# Patient Record
Sex: FEMALE | Race: AMERICAN INDIAN OR ALASKA NATIVE
[De-identification: names, ages, dates, MRNs, and addresses within clinical notes are randomized per-mention and may not be internally consistent; named-entity substitution may affect disease eponyms.]

---

## 2019-08-23 ENCOUNTER — HOSPITAL ENCOUNTER (OUTPATIENT)
Dept: HOSPITAL 5 - OR | Age: 39
Discharge: HOME | End: 2019-08-23
Attending: OBSTETRICS & GYNECOLOGY
Payer: COMMERCIAL

## 2019-08-23 ENCOUNTER — HOSPITAL ENCOUNTER (OUTPATIENT)
Dept: HOSPITAL 5 - OR | Age: 39
Discharge: HOME | End: 2019-08-23
Attending: EMERGENCY MEDICINE
Payer: COMMERCIAL

## 2019-08-23 VITALS — SYSTOLIC BLOOD PRESSURE: 117 MMHG | DIASTOLIC BLOOD PRESSURE: 63 MMHG

## 2019-08-23 VITALS — SYSTOLIC BLOOD PRESSURE: 103 MMHG | DIASTOLIC BLOOD PRESSURE: 53 MMHG

## 2019-08-23 DIAGNOSIS — Z79.899: ICD-10-CM

## 2019-08-23 DIAGNOSIS — Z87.891: ICD-10-CM

## 2019-08-23 DIAGNOSIS — O00.101: Primary | ICD-10-CM

## 2019-08-23 DIAGNOSIS — K66.1: ICD-10-CM

## 2019-08-23 DIAGNOSIS — Z53.8: ICD-10-CM

## 2019-08-23 DIAGNOSIS — Z98.890: ICD-10-CM

## 2019-08-23 LAB
HCT VFR BLD CALC: 39 % (ref 30.3–42.9)
HGB BLD-MCNC: 13.7 GM/DL (ref 10.1–14.3)
MCHC RBC AUTO-ENTMCNC: 35 % (ref 30–34)
MCV RBC AUTO: 96 FL (ref 79–97)
PLATELET # BLD: 276 K/MM3 (ref 140–440)
RBC # BLD AUTO: 4.07 M/MM3 (ref 3.65–5.03)

## 2019-08-23 PROCEDURE — C9250 ARTISS FIBRIN SEALANT: HCPCS

## 2019-08-23 PROCEDURE — 86900 BLOOD TYPING SEROLOGIC ABO: CPT

## 2019-08-23 PROCEDURE — A4217 STERILE WATER/SALINE, 500 ML: HCPCS

## 2019-08-23 PROCEDURE — 36415 COLL VENOUS BLD VENIPUNCTURE: CPT

## 2019-08-23 PROCEDURE — 99283 EMERGENCY DEPT VISIT LOW MDM: CPT

## 2019-08-23 PROCEDURE — 84702 CHORIONIC GONADOTROPIN TEST: CPT

## 2019-08-23 PROCEDURE — 59151 TREAT ECTOPIC PREGNANCY: CPT

## 2019-08-23 PROCEDURE — 85027 COMPLETE CBC AUTOMATED: CPT

## 2019-08-23 PROCEDURE — 86850 RBC ANTIBODY SCREEN: CPT

## 2019-08-23 PROCEDURE — 88305 TISSUE EXAM BY PATHOLOGIST: CPT

## 2019-08-23 PROCEDURE — 84703 CHORIONIC GONADOTROPIN ASSAY: CPT

## 2019-08-23 PROCEDURE — 86901 BLOOD TYPING SEROLOGIC RH(D): CPT

## 2019-08-23 NOTE — EMERGENCY DEPARTMENT REPORT
HPI





- General


Time Seen by Provider: 19 10:57





- HPI


HPI: 


39-year-old  female presents to the emergency department, sent 

in by the office of her OB/GYN Dr. Popeye Murrieta, with the report of a right-

sided ectopic pregnancy.  With this pregnancy, the patient is  with 2 

previous miscarriages.  The patient says that she was having some vaginal 

spotting over the past few days and went into the OB/GYN office for concern for 

a threatened miscarriage.  She had her hormone level checked 2 days ago and was 

told to return today.  In between that time, the patient says that she's been 

having some diarrhea and a little bit of right-sided abdominal discomfort.  She 

took a tramadol for her discomfort that she had from a previous miscarriage, as 

the patient says that this felt the same.  While she was at the OB/GYN office 

today, they noticed that she did not look well and decided to do an ultrasound 

that shows a right-sided ectopic pregnancy.  The patient is due to be taken to 

the operating room shortly by Dr. Murrieta.








ED Past Medical Hx





- Past Medical History


Previous Medical History?: No





- Surgical History


Past Surgical History?: Yes


Additional Surgical History: right foot





- Social History


Smoking Status: Former Smoker


Substance Use Type: Alcohol, Marijuana





ED Review of Systems


ROS: 


Stated complaint: POSITIVE EPTOPIC


Other details as noted in HPI





Comment: All other systems reviewed and negative


Constitutional: denies: chills, fever


Eyes: denies: eye pain, vision change


ENT: denies: ear pain, throat pain


Respiratory: denies: cough, shortness of breath


Cardiovascular: denies: chest pain


Gastrointestinal: abdominal pain, diarrhea


Genitourinary: other (vaginal spotting).  denies: dysuria, discharge


Musculoskeletal: denies: back pain, arthralgia


Skin: denies: rash, lesions


Neurological: denies: headache, weakness





Physical Exam





- Physical Exam


Vital Signs: 


                                   Vital Signs











  19





  10:54


 


Temperature 97.9 F


 


Pulse Rate 82


 


Respiratory 18





Rate 


 


Blood Pressure 117/63


 


O2 Sat by Pulse 98





Oximetry 











Physical Exam: 





GENERAL: The patient is well-developed well-nourished.


HENT: Normocephalic.  Atraumatic.    Patient has moist mucous membranes.


EYES: Extraocular motions are intact.  


NECK: Supple. Trachea is midline.


CHEST/LUNGS: Clear to auscultation.  There is no respiratory distress noted.


HEART/CARDIOVASCULAR: Regular.  There is no tachycardia.  There is no murmur.


ABDOMEN: Abdomen is soft.  Mild right lower quadrant abdominal and pelvic pain 

to palpation.  No guarding.  Patient has normal bowel sounds.  There is no 

abdominal distention.


SKIN: Skin is warm and dry.


NEURO: The patient is awake, alert, and oriented.  The patient is cooperative.  

The patient has no focal neurologic deficits.  Normal speech.


MUSCULOSKELETAL: There is no tenderness or deformity.   There is no evidence of 

acute injury.





ED Course


                                   Vital Signs











  19





  10:54


 


Temperature 97.9 F


 


Pulse Rate 82


 


Respiratory 18





Rate 


 


Blood Pressure 117/63


 


O2 Sat by Pulse 98





Oximetry 














ED Medical Decision Making





- Lab Data


Result diagrams: 


                                 19 11:21








- Medical Decision Making





This patient was sent in by the OB/GYN with a confirmed right ectopic adnexal 

pregnancy.  Patient appears hemodynamically stable.  Vital signs stable.  Mild 

leukocytosis.  Patient is being brought to the operating room for treatment by 

Dr. Popeye Murrieta.





- Differential Diagnosis


ectopic pregnancy, miscarriage, pregnancy


Critical Care Time: No


Critical care attestation.: 


If time is entered above; I have spent that time in minutes in the direct care 

of this critically ill patient, excluding procedure time.








ED Disposition


Clinical Impression: 


 Ectopic pregnancy





Disposition:  OP ADMIT IP TO THIS HOSP


Is pt being admited?: Yes


Condition: Serious


Time of Disposition: 12:06

## 2019-08-23 NOTE — OPERATIVE REPORT
Operative Report


Operative Report: 





Date of procedure: 08/23/2019





Pre-operative diagnosis: Right ectopic pregnancy





Post-operative diagnosis: 1.  Ruptured right ectopic pregnancy   2.  

Hematoperitoneum





Procedure name(s): Laparoscopic right salpingectomy





Surgeon: Popeye Murrieta MD





Assistant: None





Anesthesia: Gen. endotracheal intubation by Dr. Lyn





EBL: 400 mL's of hematoperitoneum





Findings: A normal uterus with normal left fallopian tube and ovary, right 

fallopian tube showed an enlarged mass occupying the entire right fallopian t

ube.  Normal ovary.  Approximately 400 mL's of hematoperitoneum.





Procedure: After the patient was correctly identified, she was prepped and 

draped in the usual straw fashion and placed in the lithotomy position.  Next 

the bladder was emptied using straight catheter, and the speculum space and the 

vaginal vault.  The anterior lip of the cervix was grasped using single-tooth 

tenaculum and the uterine manipulator was then placed and the tenaculum and 

speculum were removed.  Attention was then turned to the abdomen where first a 

periumbilical incision was made using the skin knife, and the Optiview trocar 

was inserted under direct visualization.  After an adequate amount for abdominal

insufflation, visualization of the pelvic organs found large amounts of blood 

and blood clots in the peritoneal cavity.  A suprapubic incision was made 

through which a 5 mm trocar was placed, and the suction  was used to 

suction approximate 400 mL of blood and blood clots.  The uterus appeared to be 

normal with normal left fallopian tube and ovary, and the right fallopian tube 

showed an enlarged mass occupying the entire right fallopian tube.  A right 

paramedian incision was made through which another 5 mm trocar was placed in 

order to clamp, cauterize and cut cross the right fallopian tube excising the 

entire right fallopian tube.  Copious amounts of irrigation was then performed, 

and after good hemostasis was assured the procedure was considered complete.  

The Tisseel Sealant was sprayed across the salpingectomy site, and excellent 

hemostasis was assured.  All instruments are then removed from the abdomen, the 

abdomen deflated, and the periumbilical incision was closed using 0 Vicryl 

suture in a figure-of-eight configuration of the fascia followed by 4 Monocryl 

suture in a sub-cuticular fashion on the skin.  The suprapubic and right 

paramedian incisions were closed in similar fashion.  Each incision was 

infiltrated using 0.5% Marcaine solution.  The uterine manipulator was removed. 

The Flores catheter also removed.  The patient tolerated the procedure well and 

was transported to recovery room in stable condition.

## 2019-08-23 NOTE — SHORT STAY SUMMARY
Short Stay Documentation


Date of service: 19


Narrative H&P: 





Pt is a 39-year-old  female  LMP 7/3/19 presents to the 

emergency department, sent in by the office with the report of a right-sided 

ectopic pregnancy - diagnosed by pelvic u/s today.  She says that she was having

some vaginal spotting over the past few days and went into the OB/GYN office for

concern for a threatened miscarriage.  She had her hormone level checked 2 days 

ago and was told to return today.  In between that time, the patient says that 

she's been having some diarrhea and a little bit of right-sided abdominal 

discomfort.  She took a tramadol for her discomfort that she had from a previous

miscarriage, as the patient says that this felt the same.  While she was at the 

OB/GYN office today, they noticed that she did not look well and decided to do 

an ultrasound that showed a right-sided ectopic pregnancy.  She is therefore 

admitted for a possible  Laparoscopic Salpingectomy.





- History


Principal diagnosis: Right ectopic pregnany


H&P: obtained from office


Past Medical History: No medical history


Past Surgical History: Other (foot surgery)


Social history: no significant social history, single





- Allergies and Medications


Current Medications: 


                                    Allergies





No Known Allergies Allergy (Verified 19 11:10)


   





Active Medications





Lactated Ringer's (Lactated Ringers)  1,000 mls @ 125 mls/hr IV AS DIRECT HUGO


Cefazolin Sodium (Ancef/Sterile Water 2 Gm/20 Ml)  2 gm in 20 mls @ 80 mls/hr IV

PREOP NR; Protocol


   Stop: 19 23:00











- Physical exam


General appearance: mild distress


Integumentary: no rash


HEENT: Atraumatic


Lungs: Clear to auscultation


Breasts: deferred


Heart: Regular rate


Gastrointestinal: normal


Female Genitourinary: deferred


Rectal Exam: deferred


Extremities: no ischemia, No edema


Neurological: Normal speech





- Brief post op/procedure progress note


Date of procedure: 19


Pre-op diagnosis: 1.  Ruptured right ectopic pregnancy


Post-op diagnosis: same (hematoperitoneum)


Procedure: 





Laparoscopic right salpingectomy


Anesthesia: GETA


Findings: 





Normal uterus with approximately 400 mL's of hematoperitoneum, normal left 

fallopian tube and ovary, right fallopian tube with enlarged mass occupying the 

entire fallopian tube and normal right ovary.


Surgeon: CRISTINA DUNN


Estimated blood loss: other (400ml hemoperitoneum)


Pathology: list (Right fallopian tube)


Specimen disposition: to lab


Condition: stable





- Hospital course


Hospital course: 





Unremarkable





- Disposition


Condition at discharge: Good


Disposition: DC- TO HOME OR SELFCARE





- Discharge Diagnoses


(1) Ectopic pregnancy


Status: Resolved   


Qualifiers: 


   Location of ectopic pregnancy: tubal   Intrauterine pregnancy status: without

intrauterine pregnancy   Laterality: right   Qualified Code(s): O00.101 - Right 

tubal pregnancy without intrauterine pregnancy   





(2) Hemoperitoneum


Status: Resolved   





(3) Hemoperitoneum due to rupture of right tubal ectopic pregnancy


Status: Resolved   





Short Stay Discharge Plan


Activity: no restrictions


Weight Bearing Status: Non-Weight Bearing


Diet: regular


Wound: open to air, keep clean and dry


Follow up with: 


CRISTINA DUNN MD [Primary Care Provider] - 7 Days


Prescriptions: 


HYDROcodone/APAP 5-325 [[place] 5/325] 1 each PO Q6HR PRN #30 tablet


 PRN Reason: Pain

## 2019-08-23 NOTE — ANESTHESIA CONSULTATION
Anesthesia Consult and Med Hx


Date of service: 08/23/19





- Airway


Anesthetic Teeth Evaluation: Good, Caps


ROM Head & Neck: Adequate


Mental/Hyoid Distance: Adequate


Mallampati Class: Class I


Intubation Access Assessment: Good





- Pre-Operative Health Status


ASA Pre-Surgery Classification: ASA1, Emergency


Proposed Anesthetic Plan: General





- Pulmonary


Hx Smoking: No


Hx Sleep Apnea: No





- Hematic


Hx Sickle Cell Disease: No